# Patient Record
Sex: MALE | Race: WHITE | NOT HISPANIC OR LATINO | Employment: OTHER | URBAN - METROPOLITAN AREA
[De-identification: names, ages, dates, MRNs, and addresses within clinical notes are randomized per-mention and may not be internally consistent; named-entity substitution may affect disease eponyms.]

---

## 2019-11-22 PROBLEM — I48.20 CHRONIC ATRIAL FIBRILLATION (HCC): Status: ACTIVE | Noted: 2019-11-22

## 2019-11-22 PROBLEM — Z95.1 HX OF CABG: Status: ACTIVE | Noted: 2019-11-22

## 2019-11-22 PROBLEM — R63.4 WEIGHT LOSS: Status: ACTIVE | Noted: 2019-11-22

## 2019-11-22 PROBLEM — Z95.0 PACEMAKER: Status: ACTIVE | Noted: 2019-11-22

## 2019-11-22 PROBLEM — J32.9 CHRONIC SINUSITIS: Status: ACTIVE | Noted: 2018-08-20

## 2019-11-22 PROBLEM — M19.90 ARTHRITIS: Status: ACTIVE | Noted: 2019-11-22

## 2019-11-22 PROBLEM — Z87.891 FORMER SMOKER: Status: ACTIVE | Noted: 2019-11-22

## 2019-11-22 PROBLEM — E78.2 MIXED HYPERLIPIDEMIA: Status: ACTIVE | Noted: 2019-11-22

## 2019-11-22 PROBLEM — N40.0 BPH (BENIGN PROSTATIC HYPERPLASIA): Status: ACTIVE | Noted: 2019-11-22

## 2019-11-22 PROBLEM — R73.03 PREDIABETES: Status: ACTIVE | Noted: 2019-11-22

## 2019-11-22 PROBLEM — Z90.79 S/P TURP: Status: ACTIVE | Noted: 2019-11-22

## 2019-11-22 PROBLEM — Z97.4 USES HEARING AID: Status: ACTIVE | Noted: 2019-11-22

## 2019-11-22 PROBLEM — I25.10 CAD (CORONARY ARTERY DISEASE): Status: ACTIVE | Noted: 2019-11-22

## 2020-03-27 PROBLEM — L11.1 GROVER'S DISEASE: Status: ACTIVE | Noted: 2020-03-27

## 2020-05-26 PROBLEM — L11.1 GROVER'S DISEASE: Status: RESOLVED | Noted: 2020-03-27 | Resolved: 2020-05-26

## 2020-11-17 PROBLEM — H91.90 HEARING LOSS: Status: ACTIVE | Noted: 2020-11-17

## 2020-11-17 PROBLEM — K21.9 GASTROESOPHAGEAL REFLUX DISEASE: Status: ACTIVE | Noted: 2020-11-17

## 2020-11-17 PROBLEM — I82.409 DEEP VEIN THROMBOSIS (DVT) (HCC): Status: ACTIVE | Noted: 2020-11-17

## 2020-11-17 PROBLEM — I38 VALVULAR HEART DISEASE: Status: ACTIVE | Noted: 2020-11-17

## 2020-11-17 PROBLEM — I49.5 SICK SINUS SYNDROME (HCC): Status: ACTIVE | Noted: 2020-11-17

## 2020-11-17 PROBLEM — N40.0 ENLARGED PROSTATE: Status: ACTIVE | Noted: 2020-11-17

## 2020-11-25 ENCOUNTER — TELEPHONE (OUTPATIENT)
Dept: CARDIOLOGY | Facility: MEDICAL CENTER | Age: 80
End: 2020-11-25

## 2020-11-25 NOTE — TELEPHONE ENCOUNTER
Referred to [facility] inpatient for AS. No referral in basket and needs apt, recommend structural heart apt to expedite the process. Mod-severe AS on echo. Please call patient, confirm he will come to [facility] for cardiac care and then place referral per notes in chart. SC

## 2020-12-01 ENCOUNTER — TELEPHONE (OUTPATIENT)
Dept: CARDIOLOGY | Facility: MEDICAL CENTER | Age: 80
End: 2020-12-01

## 2020-12-01 NOTE — TELEPHONE ENCOUNTER
"Attempted to contact patient regarding referral to [facility] placed while inpt. Voice mail boxes state leave message for \"Bella\", no such person with that name in patient's medical records. Will continue attempts to contact patient.   "

## 2021-02-18 PROBLEM — I35.0 MILD AORTIC STENOSIS: Status: ACTIVE | Noted: 2021-02-08

## 2021-02-18 PROBLEM — I65.29 CAROTID ATHEROSCLEROSIS: Status: ACTIVE | Noted: 2021-02-08

## 2021-02-18 PROBLEM — I35.0 NONRHEUMATIC AORTIC VALVE STENOSIS: Status: ACTIVE | Noted: 2021-02-08

## 2021-02-18 PROBLEM — M19.042 ARTHRITIS OF BOTH HANDS: Status: ACTIVE | Noted: 2021-02-18

## 2021-02-18 PROBLEM — I35.0 MILD AORTIC STENOSIS: Status: RESOLVED | Noted: 2021-02-08 | Resolved: 2021-02-18

## 2021-02-18 PROBLEM — M19.041 ARTHRITIS OF BOTH HANDS: Status: ACTIVE | Noted: 2021-02-18

## 2021-10-19 PROBLEM — R73.03 PREDIABETES: Status: RESOLVED | Noted: 2019-11-22 | Resolved: 2021-10-19

## 2022-03-02 PROBLEM — I20.89 ANGINA AT REST (HCC): Status: RESOLVED | Noted: 2022-01-19 | Resolved: 2022-03-02

## 2022-03-02 PROBLEM — I35.0 SEVERE AORTIC STENOSIS: Status: ACTIVE | Noted: 2022-01-19

## 2022-03-02 PROBLEM — I20.89 ANGINA AT REST (HCC): Status: ACTIVE | Noted: 2022-01-19

## 2022-03-02 PROBLEM — Z63.4 BEREAVEMENT: Status: ACTIVE | Noted: 2022-03-02

## 2022-09-07 PROBLEM — I10 ESSENTIAL (PRIMARY) HYPERTENSION: Status: ACTIVE | Noted: 2021-10-04

## 2023-10-03 ENCOUNTER — HOSPITAL ENCOUNTER (INPATIENT)
Facility: MEDICAL CENTER | Age: 83
LOS: 2 days | DRG: 281 | End: 2023-10-05
Attending: EMERGENCY MEDICINE | Admitting: STUDENT IN AN ORGANIZED HEALTH CARE EDUCATION/TRAINING PROGRAM
Payer: MEDICARE

## 2023-10-03 ENCOUNTER — APPOINTMENT (OUTPATIENT)
Dept: RADIOLOGY | Facility: MEDICAL CENTER | Age: 83
DRG: 281 | End: 2023-10-03
Attending: EMERGENCY MEDICINE
Payer: MEDICARE

## 2023-10-03 DIAGNOSIS — I21.4 NSTEMI (NON-ST ELEVATED MYOCARDIAL INFARCTION) (HCC): ICD-10-CM

## 2023-10-03 DIAGNOSIS — I82.501 CHRONIC DEEP VEIN THROMBOSIS (DVT) OF RIGHT LOWER EXTREMITY, UNSPECIFIED VEIN (HCC): ICD-10-CM

## 2023-10-03 DIAGNOSIS — I48.20 CHRONIC ATRIAL FIBRILLATION (HCC): ICD-10-CM

## 2023-10-03 DIAGNOSIS — R79.89 ELEVATED TROPONIN: ICD-10-CM

## 2023-10-03 DIAGNOSIS — R42 LIGHTHEADEDNESS: ICD-10-CM

## 2023-10-03 PROBLEM — Z71.89 ACP (ADVANCE CARE PLANNING): Status: ACTIVE | Noted: 2023-10-03

## 2023-10-03 LAB
ALBUMIN SERPL BCP-MCNC: 3.8 G/DL (ref 3.2–4.9)
ALBUMIN/GLOB SERPL: 1.7 G/DL
ALP SERPL-CCNC: 91 U/L (ref 30–99)
ALT SERPL-CCNC: 17 U/L (ref 2–50)
ANION GAP SERPL CALC-SCNC: 11 MMOL/L (ref 7–16)
APTT PPP: 32.3 SEC (ref 24.7–36)
AST SERPL-CCNC: 19 U/L (ref 12–45)
BASOPHILS # BLD AUTO: 0.7 % (ref 0–1.8)
BASOPHILS # BLD: 0.08 K/UL (ref 0–0.12)
BILIRUB SERPL-MCNC: 0.8 MG/DL (ref 0.1–1.5)
BUN SERPL-MCNC: 17 MG/DL (ref 8–22)
CALCIUM ALBUM COR SERPL-MCNC: 8.5 MG/DL (ref 8.5–10.5)
CALCIUM SERPL-MCNC: 8.3 MG/DL (ref 8.5–10.5)
CHLORIDE SERPL-SCNC: 107 MMOL/L (ref 96–112)
CO2 SERPL-SCNC: 24 MMOL/L (ref 20–33)
CREAT SERPL-MCNC: 0.84 MG/DL (ref 0.5–1.4)
EKG IMPRESSION: NORMAL
EKG IMPRESSION: NORMAL
EOSINOPHIL # BLD AUTO: 0.17 K/UL (ref 0–0.51)
EOSINOPHIL NFR BLD: 1.5 % (ref 0–6.9)
ERYTHROCYTE [DISTWIDTH] IN BLOOD BY AUTOMATED COUNT: 45.2 FL (ref 35.9–50)
GFR SERPLBLD CREATININE-BSD FMLA CKD-EPI: 86 ML/MIN/1.73 M 2
GLOBULIN SER CALC-MCNC: 2.3 G/DL (ref 1.9–3.5)
GLUCOSE SERPL-MCNC: 88 MG/DL (ref 65–99)
HCT VFR BLD AUTO: 44.1 % (ref 42–52)
HGB BLD-MCNC: 14.7 G/DL (ref 14–18)
IMM GRANULOCYTES # BLD AUTO: 0.03 K/UL (ref 0–0.11)
IMM GRANULOCYTES NFR BLD AUTO: 0.3 % (ref 0–0.9)
INR PPP: 1.38 (ref 0.87–1.13)
LIPASE SERPL-CCNC: 13 U/L (ref 11–82)
LYMPHOCYTES # BLD AUTO: 2.06 K/UL (ref 1–4.8)
LYMPHOCYTES NFR BLD: 18 % (ref 22–41)
MCH RBC QN AUTO: 30 PG (ref 27–33)
MCHC RBC AUTO-ENTMCNC: 33.3 G/DL (ref 32.3–36.5)
MCV RBC AUTO: 90 FL (ref 81.4–97.8)
MONOCYTES # BLD AUTO: 0.94 K/UL (ref 0–0.85)
MONOCYTES NFR BLD AUTO: 8.2 % (ref 0–13.4)
NEUTROPHILS # BLD AUTO: 8.17 K/UL (ref 1.82–7.42)
NEUTROPHILS NFR BLD: 71.3 % (ref 44–72)
NRBC # BLD AUTO: 0 K/UL
NRBC BLD-RTO: 0 /100 WBC (ref 0–0.2)
PLATELET # BLD AUTO: 184 K/UL (ref 164–446)
PMV BLD AUTO: 9.7 FL (ref 9–12.9)
POTASSIUM SERPL-SCNC: 3.6 MMOL/L (ref 3.6–5.5)
PROT SERPL-MCNC: 6.1 G/DL (ref 6–8.2)
PROTHROMBIN TIME: 17.1 SEC (ref 12–14.6)
RBC # BLD AUTO: 4.9 M/UL (ref 4.7–6.1)
SODIUM SERPL-SCNC: 142 MMOL/L (ref 135–145)
TROPONIN T SERPL-MCNC: 31 NG/L (ref 6–19)
TROPONIN T SERPL-MCNC: 35 NG/L (ref 6–19)
UFH PPP CHRO-ACNC: >1.1 IU/ML
WBC # BLD AUTO: 11.5 K/UL (ref 4.8–10.8)

## 2023-10-03 PROCEDURE — 93005 ELECTROCARDIOGRAM TRACING: CPT | Performed by: EMERGENCY MEDICINE

## 2023-10-03 PROCEDURE — 85730 THROMBOPLASTIN TIME PARTIAL: CPT

## 2023-10-03 PROCEDURE — 700102 HCHG RX REV CODE 250 W/ 637 OVERRIDE(OP): Performed by: STUDENT IN AN ORGANIZED HEALTH CARE EDUCATION/TRAINING PROGRAM

## 2023-10-03 PROCEDURE — 84145 PROCALCITONIN (PCT): CPT

## 2023-10-03 PROCEDURE — 99223 1ST HOSP IP/OBS HIGH 75: CPT | Mod: 25,AI | Performed by: STUDENT IN AN ORGANIZED HEALTH CARE EDUCATION/TRAINING PROGRAM

## 2023-10-03 PROCEDURE — 85520 HEPARIN ASSAY: CPT

## 2023-10-03 PROCEDURE — A9270 NON-COVERED ITEM OR SERVICE: HCPCS | Performed by: STUDENT IN AN ORGANIZED HEALTH CARE EDUCATION/TRAINING PROGRAM

## 2023-10-03 PROCEDURE — 71045 X-RAY EXAM CHEST 1 VIEW: CPT

## 2023-10-03 PROCEDURE — 93005 ELECTROCARDIOGRAM TRACING: CPT

## 2023-10-03 PROCEDURE — 99285 EMERGENCY DEPT VISIT HI MDM: CPT

## 2023-10-03 PROCEDURE — 85025 COMPLETE CBC W/AUTO DIFF WBC: CPT | Mod: 91

## 2023-10-03 PROCEDURE — 84484 ASSAY OF TROPONIN QUANT: CPT | Mod: 91

## 2023-10-03 PROCEDURE — 99497 ADVNCD CARE PLAN 30 MIN: CPT | Performed by: STUDENT IN AN ORGANIZED HEALTH CARE EDUCATION/TRAINING PROGRAM

## 2023-10-03 PROCEDURE — G0316 PR PROLONGED IP/OBS E&M EA 15 MIN: HCPCS | Performed by: STUDENT IN AN ORGANIZED HEALTH CARE EDUCATION/TRAINING PROGRAM

## 2023-10-03 PROCEDURE — 770020 HCHG ROOM/CARE - TELE (206)

## 2023-10-03 PROCEDURE — 83690 ASSAY OF LIPASE: CPT

## 2023-10-03 PROCEDURE — 93010 ELECTROCARDIOGRAM REPORT: CPT | Performed by: STUDENT IN AN ORGANIZED HEALTH CARE EDUCATION/TRAINING PROGRAM

## 2023-10-03 PROCEDURE — 36415 COLL VENOUS BLD VENIPUNCTURE: CPT

## 2023-10-03 PROCEDURE — 85610 PROTHROMBIN TIME: CPT

## 2023-10-03 PROCEDURE — 80053 COMPREHEN METABOLIC PANEL: CPT | Mod: 91

## 2023-10-03 PROCEDURE — 700111 HCHG RX REV CODE 636 W/ 250 OVERRIDE (IP): Performed by: STUDENT IN AN ORGANIZED HEALTH CARE EDUCATION/TRAINING PROGRAM

## 2023-10-03 RX ORDER — HEPARIN SODIUM 1000 [USP'U]/ML
2000 INJECTION, SOLUTION INTRAVENOUS; SUBCUTANEOUS PRN
Status: DISCONTINUED | OUTPATIENT
Start: 2023-10-03 | End: 2023-10-03

## 2023-10-03 RX ORDER — HEPARIN SODIUM 5000 [USP'U]/100ML
INJECTION, SOLUTION INTRAVENOUS CONTINUOUS
Status: DISCONTINUED | OUTPATIENT
Start: 2023-10-03 | End: 2023-10-03

## 2023-10-03 RX ORDER — HEPARIN SODIUM 1000 [USP'U]/ML
2600 INJECTION, SOLUTION INTRAVENOUS; SUBCUTANEOUS PRN
Status: DISCONTINUED | OUTPATIENT
Start: 2023-10-03 | End: 2023-10-03

## 2023-10-03 RX ORDER — ATORVASTATIN CALCIUM 40 MG/1
40 TABLET, FILM COATED ORAL EVERY EVENING
Status: DISCONTINUED | OUTPATIENT
Start: 2023-10-03 | End: 2023-10-05 | Stop reason: HOSPADM

## 2023-10-03 RX ORDER — HEPARIN SODIUM 1000 [USP'U]/ML
4000 INJECTION, SOLUTION INTRAVENOUS; SUBCUTANEOUS ONCE
Status: COMPLETED | OUTPATIENT
Start: 2023-10-03 | End: 2023-10-03

## 2023-10-03 RX ORDER — ACETAMINOPHEN AND CODEINE PHOSPHATE 300; 30 MG/1; MG/1
1 TABLET ORAL
COMMUNITY
End: 2024-02-29 | Stop reason: SDUPTHER

## 2023-10-03 RX ORDER — LISINOPRIL 20 MG/1
20 TABLET ORAL
Status: DISCONTINUED | OUTPATIENT
Start: 2023-10-03 | End: 2023-10-05 | Stop reason: HOSPADM

## 2023-10-03 RX ORDER — HEPARIN SODIUM 5000 [USP'U]/100ML
0-30 INJECTION, SOLUTION INTRAVENOUS CONTINUOUS
Status: DISCONTINUED | OUTPATIENT
Start: 2023-10-03 | End: 2023-10-03

## 2023-10-03 RX ORDER — ACETAMINOPHEN 500 MG
500 TABLET ORAL EVERY 6 HOURS PRN
COMMUNITY

## 2023-10-03 RX ORDER — AZELASTINE HYDROCHLORIDE 137 UG/1
2 SPRAY, METERED NASAL
COMMUNITY

## 2023-10-03 RX ORDER — LABETALOL HYDROCHLORIDE 5 MG/ML
10 INJECTION, SOLUTION INTRAVENOUS EVERY 4 HOURS PRN
Status: DISCONTINUED | OUTPATIENT
Start: 2023-10-03 | End: 2023-10-05 | Stop reason: HOSPADM

## 2023-10-03 RX ORDER — HEPARIN SODIUM 1000 [USP'U]/ML
4000 INJECTION, SOLUTION INTRAVENOUS; SUBCUTANEOUS ONCE
Status: DISCONTINUED | OUTPATIENT
Start: 2023-10-03 | End: 2023-10-03

## 2023-10-03 RX ORDER — ACETAMINOPHEN 325 MG/1
650 TABLET ORAL EVERY 6 HOURS PRN
Status: DISCONTINUED | OUTPATIENT
Start: 2023-10-03 | End: 2023-10-05 | Stop reason: HOSPADM

## 2023-10-03 RX ORDER — ASPIRIN 81 MG/1
81 TABLET, CHEWABLE ORAL DAILY
Status: DISCONTINUED | OUTPATIENT
Start: 2023-10-04 | End: 2023-10-04

## 2023-10-03 RX ORDER — PHENOL 1.4 %
1 AEROSOL, SPRAY (ML) MUCOUS MEMBRANE EVERY EVENING
COMMUNITY

## 2023-10-03 RX ADMIN — HEPARIN SODIUM 1050 UNITS/HR: 5000 INJECTION, SOLUTION INTRAVENOUS at 22:30

## 2023-10-03 RX ADMIN — HEPARIN SODIUM 12 UNITS/KG/HR: 5000 INJECTION, SOLUTION INTRAVENOUS at 19:37

## 2023-10-03 RX ADMIN — ATORVASTATIN CALCIUM 40 MG: 40 TABLET, FILM COATED ORAL at 19:49

## 2023-10-03 RX ADMIN — HEPARIN SODIUM 4000 UNITS: 1000 INJECTION, SOLUTION INTRAVENOUS; SUBCUTANEOUS at 19:36

## 2023-10-03 RX ADMIN — LISINOPRIL 20 MG: 20 TABLET ORAL at 19:49

## 2023-10-03 ASSESSMENT — LIFESTYLE VARIABLES
ON A TYPICAL DAY WHEN YOU DRINK ALCOHOL HOW MANY DRINKS DO YOU HAVE: 1
TOTAL SCORE: 0
EVER HAD A DRINK FIRST THING IN THE MORNING TO STEADY YOUR NERVES TO GET RID OF A HANGOVER: NO
AVERAGE NUMBER OF DAYS PER WEEK YOU HAVE A DRINK CONTAINING ALCOHOL: 4
EVER FELT BAD OR GUILTY ABOUT YOUR DRINKING: NO
HOW MANY TIMES IN THE PAST YEAR HAVE YOU HAD 5 OR MORE DRINKS IN A DAY: 0
TOTAL SCORE: 0
TOTAL SCORE: 0
DOES PATIENT WANT TO STOP DRINKING: NO
HAVE PEOPLE ANNOYED YOU BY CRITICIZING YOUR DRINKING: NO
CONSUMPTION TOTAL: NEGATIVE
ALCOHOL_USE: YES
HAVE YOU EVER FELT YOU SHOULD CUT DOWN ON YOUR DRINKING: NO

## 2023-10-03 ASSESSMENT — COGNITIVE AND FUNCTIONAL STATUS - GENERAL
WALKING IN HOSPITAL ROOM: A LITTLE
CLIMB 3 TO 5 STEPS WITH RAILING: A LITTLE
DAILY ACTIVITIY SCORE: 24
SUGGESTED CMS G CODE MODIFIER MOBILITY: CJ
SUGGESTED CMS G CODE MODIFIER DAILY ACTIVITY: CH
MOBILITY SCORE: 22

## 2023-10-03 ASSESSMENT — ENCOUNTER SYMPTOMS
MUSCULOSKELETAL NEGATIVE: 1
GASTROINTESTINAL NEGATIVE: 1
WEAKNESS: 1
RESPIRATORY NEGATIVE: 1
DIZZINESS: 1
PSYCHIATRIC NEGATIVE: 1
CARDIOVASCULAR NEGATIVE: 1
EYES NEGATIVE: 1

## 2023-10-03 ASSESSMENT — PATIENT HEALTH QUESTIONNAIRE - PHQ9
SUM OF ALL RESPONSES TO PHQ9 QUESTIONS 1 AND 2: 0
2. FEELING DOWN, DEPRESSED, IRRITABLE, OR HOPELESS: NOT AT ALL
1. LITTLE INTEREST OR PLEASURE IN DOING THINGS: NOT AT ALL

## 2023-10-03 ASSESSMENT — FIBROSIS 4 INDEX
FIB4 SCORE: 2
FIB4 SCORE: 2.08
FIB4 SCORE: 2.08

## 2023-10-04 ENCOUNTER — APPOINTMENT (OUTPATIENT)
Dept: RADIOLOGY | Facility: MEDICAL CENTER | Age: 83
DRG: 281 | End: 2023-10-04
Attending: STUDENT IN AN ORGANIZED HEALTH CARE EDUCATION/TRAINING PROGRAM
Payer: MEDICARE

## 2023-10-04 ENCOUNTER — APPOINTMENT (OUTPATIENT)
Dept: CARDIOLOGY | Facility: MEDICAL CENTER | Age: 83
DRG: 281 | End: 2023-10-04
Attending: HOSPITALIST
Payer: MEDICARE

## 2023-10-04 LAB
LV EJECT FRACT  99904: 55
LV EJECT FRACT MOD 2C 99903: 54.25
LV EJECT FRACT MOD 4C 99902: 67.05
LV EJECT FRACT MOD BP 99901: 60.76
PROCALCITONIN SERPL-MCNC: 0.05 NG/ML
TROPONIN T SERPL-MCNC: 32 NG/L (ref 6–19)

## 2023-10-04 PROCEDURE — 93971 EXTREMITY STUDY: CPT | Mod: RT

## 2023-10-04 PROCEDURE — 97162 PT EVAL MOD COMPLEX 30 MIN: CPT

## 2023-10-04 PROCEDURE — 84484 ASSAY OF TROPONIN QUANT: CPT

## 2023-10-04 PROCEDURE — 99233 SBSQ HOSP IP/OBS HIGH 50: CPT | Mod: 25 | Performed by: HOSPITALIST

## 2023-10-04 PROCEDURE — A9270 NON-COVERED ITEM OR SERVICE: HCPCS | Performed by: HOSPITALIST

## 2023-10-04 PROCEDURE — 93306 TTE W/DOPPLER COMPLETE: CPT

## 2023-10-04 PROCEDURE — 700102 HCHG RX REV CODE 250 W/ 637 OVERRIDE(OP): Performed by: STUDENT IN AN ORGANIZED HEALTH CARE EDUCATION/TRAINING PROGRAM

## 2023-10-04 PROCEDURE — 770020 HCHG ROOM/CARE - TELE (206)

## 2023-10-04 PROCEDURE — G0316 PR PROLONGED IP/OBS E&M EA 15 MIN: HCPCS | Performed by: HOSPITALIST

## 2023-10-04 PROCEDURE — 97165 OT EVAL LOW COMPLEX 30 MIN: CPT

## 2023-10-04 PROCEDURE — A9270 NON-COVERED ITEM OR SERVICE: HCPCS | Performed by: STUDENT IN AN ORGANIZED HEALTH CARE EDUCATION/TRAINING PROGRAM

## 2023-10-04 PROCEDURE — 93306 TTE W/DOPPLER COMPLETE: CPT | Mod: 26 | Performed by: INTERNAL MEDICINE

## 2023-10-04 PROCEDURE — 700102 HCHG RX REV CODE 250 W/ 637 OVERRIDE(OP): Performed by: HOSPITALIST

## 2023-10-04 PROCEDURE — 36415 COLL VENOUS BLD VENIPUNCTURE: CPT

## 2023-10-04 RX ADMIN — ATORVASTATIN CALCIUM 40 MG: 40 TABLET, FILM COATED ORAL at 17:46

## 2023-10-04 RX ADMIN — ASPIRIN 81 MG 81 MG: 81 TABLET ORAL at 05:10

## 2023-10-04 RX ADMIN — APIXABAN 10 MG: 5 TABLET, FILM COATED ORAL at 17:46

## 2023-10-04 ASSESSMENT — COGNITIVE AND FUNCTIONAL STATUS - GENERAL
SUGGESTED CMS G CODE MODIFIER DAILY ACTIVITY: CH
MOBILITY SCORE: 18
CLIMB 3 TO 5 STEPS WITH RAILING: A LITTLE
MOVING FROM LYING ON BACK TO SITTING ON SIDE OF FLAT BED: A LITTLE
WALKING IN HOSPITAL ROOM: A LITTLE
SUGGESTED CMS G CODE MODIFIER MOBILITY: CK
TURNING FROM BACK TO SIDE WHILE IN FLAT BAD: A LITTLE
DAILY ACTIVITIY SCORE: 24
STANDING UP FROM CHAIR USING ARMS: A LITTLE
MOVING TO AND FROM BED TO CHAIR: A LITTLE

## 2023-10-04 ASSESSMENT — ENCOUNTER SYMPTOMS
HEADACHES: 0
BRUISES/BLEEDS EASILY: 0
WHEEZING: 0
DIZZINESS: 0
DEPRESSION: 0
VOMITING: 0
BACK PAIN: 0
CLAUDICATION: 0
NAUSEA: 0
HEMOPTYSIS: 0
DOUBLE VISION: 0
HEARTBURN: 0
MYALGIAS: 0
CHILLS: 0
PND: 0
FEVER: 0
PALPITATIONS: 0
COUGH: 0
BLURRED VISION: 0

## 2023-10-04 ASSESSMENT — GAIT ASSESSMENTS
DISTANCE (FEET): 175
GAIT LEVEL OF ASSIST: SUPERVISED

## 2023-10-04 ASSESSMENT — FIBROSIS 4 INDEX
FIB4 SCORE: 2.08
FIB4 SCORE: 2.08

## 2023-10-04 ASSESSMENT — PAIN DESCRIPTION - PAIN TYPE: TYPE: ACUTE PAIN

## 2023-10-04 ASSESSMENT — ACTIVITIES OF DAILY LIVING (ADL): TOILETING: INDEPENDENT

## 2023-10-04 NOTE — THERAPY
Physical Therapy   Initial Evaluation     Patient Name: Triston Vallejo  Age:  83 y.o., Sex:  male  Medical Record #: [id number]  Today's Date: 10/4/2023          Assessment  Patient is 83 y.o. male who dizziness. 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 Pt elevated troponin's, per chart elevated trops likely from uncontrolled hypertension.  Pt mobilizing w/o AD for functional distances. No LOB or SOB. No skilled inpatient or post acute therapy indicated.     Plan         DC Equipment Recommendations: (P) None  Discharge Recommendations: (P) Recommend post-acute placement for additional physical therapy services prior to discharge home         Initial Contact Note    Initial Contact Note Order Received and Verified, Physical Therapy Evaluation in Progress with Full Report to Follow.   Prior Living Situation   Prior Services None   Housing / Facility 1 Story House   Lives with - Patient's Self Care Capacity Alone and Able to Care For Self   Prior Level of Functional Mobility   Bed Mobility Independent   Transfer Status Independent   Ambulation Independent   Assistive Devices Used None   History of Falls   History of Falls Yes   Date of Last Fall 09/28/23  (tripped while working outside)   Cognition    Level of Consciousness Alert   Comments [place]   Active ROM Lower Body    Active ROM Lower Body  WDL   Strength Lower Body   Lower Body Strength  WDL   Sensation Lower Body   Lower Extremity Sensation   Not Tested   Neurological Concerns   Neurological Concerns No   Other Treatments   Other Treatments Provided education on fall prevention, OOB, pacing   Balance Assessment   Sitting Balance (Static) Good   Sitting Balance (Dynamic) Good   Standing Balance (Static) Good   Standing Balance (Dynamic) Fair +   Weight Shift Sitting Good   Weight Shift Standing Fair   Comments w/o AD   Bed Mobility    Supine to Sit Independent   Sit to  Supine Independent   Scooting Independent   Rolling Independent   Gait Analysis   Gait Level Of Assist Supervised   Assistive Device None   Distance (Feet) 175   # of Times Distance was Traveled 1   Deviation   (lateral list  , decreased TO/HS RLE)   Weight Bearing Status no restrictions.   Vision Deficits Impacting Mobility states vision has improved.   Functional Mobility   Sit to Stand Supervised   Bed, Chair, Wheelchair Transfer Supervised   Transfer Method Stand Step   How much difficulty does the patient currently have...   Turning over in bed (including adjusting bedclothes, sheets and blankets)? 3   Sitting down on and standing up from a chair with arms (e.g., wheelchair, bedside commode, etc.) 3   Moving from lying on back to sitting on the side of the bed? 3   How much help from another person does the patient currently need...   Moving to and from a bed to a chair (including a wheelchair)? 3   Need to walk in a hospital room? 3   Climbing 3-5 steps with a railing? 3   6 clicks Mobility Score 18   Activity Tolerance   Sitting in Chair functional   Sitting Edge of Bed functional   Standing functional   Education Group   Education Provided Role of Physical Therapist   Role of Physical Therapist Patient Response Patient;Acceptance;Explanation;Verbal Demonstration   Anticipated Discharge Equipment and Recommendations   DC Equipment Recommendations None   Discharge Recommendations Recommend post-acute placement for additional physical therapy services prior to discharge home   Interdisciplinary Plan of Care Collaboration   IDT Collaboration with  Nursing   Patient Position at End of Therapy Seated   Collaboration Comments RN updated.   Session Information   Date / Session Number  10/4 Ortega only.

## 2023-10-04 NOTE — THERAPY
"Occupational Therapy   Initial Evaluation     Patient Name: Triston Vallejo  Age:  83 y.o., Sex:  male  Medical Record #: [id number]  Today's Date: 10/4/2023          Assessment  Patient is 83 y.o. male who presents to acute w/ dizziness. Pt had elevated troponin's, per chart elevated trops likely from uncontrolled hypertension. Pt appears close to functional baseline performing BADLs at SPV level. No further acute OT needs at this time.     Plan         DC Equipment Recommendations: (P) None  Discharge Recommendations: (P) Anticipate that the patient will have no further occupational therapy needs after discharge from the hospital     Subjective    \"I wish I lived in [place]\"     Objective       10/04/23 1106   Charge Group   OT Evaluation OT Evaluation Low   Total Time Spent   OT Evaluation (Minutes) 20   Initial Contact Note    Initial Contact Note Order Received and Verified, Occupational Therapy Evaluation in Progress with Full Report to Follow.   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 [place]   Prior Level of ADL Function   Self Feeding Independent   Grooming / Hygiene Independent   Bathing Independent   Dressing Independent   Toileting Independent   Prior Level of IADL Function   Medication Management Independent   Laundry Independent   Kitchen Mobility Independent   Finances Independent   Home Management Independent   Shopping Independent   Prior Level Of Mobility Independent Without Device in Community;Independent Without Device in Home   Driving / Transportation Driving Independent   Vitals   O2 (LPM) 0   O2 Delivery Device None - Room Air   Pain 0 - 10 Group   Therapist Pain Assessment Post Activity Pain Same as Prior to Activity;Nurse Notified  (no c/o pain during session)   Cognition  "   Cognition / Consciousness WDL   Level of Consciousness Alert   Comments Ramona, pleasent and cooperative   Active ROM Upper Body   Active ROM Upper Body  WDL   Strength Upper Body   Upper Body Strength  WDL   Coordination Upper Body   Coordination WDL   Balance Assessment   Sitting Balance (Static) Good   Sitting Balance (Dynamic) Good   Standing Balance (Static) Fair +   Standing Balance (Dynamic) Fair +   Weight Shift Sitting Good   Weight Shift Standing Good   Comments no AD, no LOB   Bed Mobility    Supine to Sit Supervised   Sit to Supine   (NT in chair post)   Scooting Supervised   ADL Assessment   Grooming Supervision;Standing   Upper Body Dressing Supervision   Lower Body Dressing Supervision   How much help from another person does the patient currently need...   Putting on and taking off regular lower body clothing? 4   Bathing (including washing, rinsing, and drying)? 4   Toileting, which includes using a toilet, bedpan, or urinal? 4   Putting on and taking off regular upper body clothing? 4   Taking care of personal grooming such as brushing teeth? 4   Eating meals? 4   6 Clicks Daily Activity Score 24   Functional Mobility   Sit to Stand Supervised   Bed, Chair, Wheelchair Transfer Supervised   Toilet Transfers Supervised   Mobility within room and bathroom w/ no AD   Activity Tolerance   Sitting in Chair 10 min (up post)   Sitting Edge of Bed 5 min   Standing 6 min   Education Group   Role of Occupational Therapist Patient Response Patient;Acceptance;Explanation;Demonstration;Verbal Demonstration;Action Demonstration   Anticipated Discharge Equipment and Recommendations   DC Equipment Recommendations None   Discharge Recommendations Anticipate that the patient will have no further occupational therapy needs after discharge from the hospital   Interdisciplinary Plan of Care Collaboration   IDT Collaboration with  Nursing   Patient Position at End of Therapy Seated;Call Light within Reach;Tray Table  within Reach;Phone within Reach   Collaboration Comments report given   Session Information   Date / Session Number  10/4, 1x only

## 2023-10-04 NOTE — CARE PLAN
The patient is Watcher - Medium risk of patient condition declining or worsening    Shift Goals  Clinical Goals: stable cardiac and vital signs  Patient Goals: rest, go home  Family Goals: roland    Progress made toward(s) clinical / shift goals:    Problem: Knowledge Deficit - Standard  Goal: Patient and family/care givers will demonstrate understanding of plan of care, disease process/condition, diagnostic tests and medications  Outcome: Progressing     Problem: Fall Risk  Goal: Patient will remain free from falls  Outcome: Progressing       Patient is not progressing towards the following goals:

## 2023-10-04 NOTE — ED NOTES
Med rec completed per patient at bedside.  Allergies reviewed with patient.  Patient's preferred pharmacy: [facility] in [place].    No outpatient antibiotics within the last 30 days.  ANTICOAGULATION: Patient is on XARELTO. Last castellano 10/2/2023 at 18:00.

## 2023-10-04 NOTE — H&P
Hospital Medicine History & Physical Note    Date of Service  10/3/2023    Primary Care Physician  Pawan Rose M.D.    Consultants  None    Code Status  Full Code    Chief Complaint  Chief Complaint   Patient presents with    Lightheadedness     Pt transferred from [facility] for NSTEMI       History of Presenting Illness  Triston Vallejo is a 83 y.o. male who presented 10/3/2023 with dizziness.    Patient has history of total aortic valve replacement in June 2023, A-fib on Xarelto, CAD, hypertension, pacemaker placement    Patient was seen at [facility] this morning.  He presented for suture removal after a fall involving a head injury on September 18.  CAT scan head at that time was negative for acute pathology.  Patient reported to staff that he has been having intermittent episodes of lightheadedness, shortness of breath, generalized weakness for the last few weeks.  Labs were drawn and patient was diagnosed with NSTEMI.    Pertinent labs include:  White blood cell count 11.7 hemoglobin 14.2 platelet 176  Sodium 138 potassium 4.1 chloride 104 bicarb 26 anion gap 12 BUN 22 creatinine 1  Found to have troponin 67 -> 104  BNP 2284  EKG showing ventricular paced rhythm.    Patient was given 162 mg of aspirin and transferred to [facility] for further management of NSTEMI.      In ER, patient found to have elevated blood pressure.     I discussed the plan of care with patient.    Review of Systems  Review of Systems   Constitutional:  Positive for malaise/fatigue.   HENT: Negative.     Eyes: Negative.    Respiratory: Negative.     Cardiovascular: Negative.    Gastrointestinal: Negative.    Genitourinary: Negative.    Musculoskeletal: Negative.    Skin: Negative.    Neurological:  Positive for dizziness and weakness.   Endo/Heme/Allergies: Negative.    Psychiatric/Behavioral: Negative.         Past Medical History   has a past medical history of A-fib (HCC), Arthritis, CAD (coronary artery disease),  Diverticulitis, HTN (hypertension), Myocardial infarct (HCC) (3/22/2016), and Pacemaker.    Surgical History   has a past surgical history that includes pacemaker insertion; multiple coronary artery bypass (05/27/2016); hip arthroplasty total (Bilateral); and aortic valve replacement.     Family History  family history is not on file.   Family history reviewed with patient. There is no family history that is pertinent to the chief complaint.     Social History   reports that he has quit smoking. His smoking use included cigarettes. He has never used smokeless tobacco. He reports current alcohol use. He reports that he does not use drugs.    Allergies  Allergies   Allergen Reactions    Norco [Hydrocodone-Acetaminophen]     Repatha [Evolocumab]      swelling    Statins [Hmg-Coa-R Inhibitors]     Valium        Medications  Prior to Admission Medications   Prescriptions Last Dose Informant Patient Reported? Taking?   XARELTO 20 MG Tab tablet   No No   Sig: TAKE 1 TABLET WITH DINNER (NEED LABS, NEED TO ESTABLISH WITH NEW PRIMARY CARE PHYSICIAN)   furosemide (LASIX) 20 MG Tab   Yes No   Sig: Take 20 mg by mouth every day.   losartan (COZAAR) 25 MG Tab   Yes No   Sig: Take 25 mg by mouth every day.   nitroglycerin (NITROSTAT) 0.4 MG SL Tab   Yes No   Sig: Place 0.4 mg under the tongue every 5 minutes as needed for Chest Pain.      Facility-Administered Medications: None       Physical Exam  Temp:  [36.1 °C (97 °F)-36.7 °C (98 °F)] 36.7 °C (98 °F)  Pulse:  [60-68] 60  Resp:  [13-22] 16  BP: (152-193)/(82-95) 193/85  SpO2:  [94 %-96 %] 95 %  Blood Pressure : (!) 193/85   Temperature: 36.7 °C (98 °F)   Pulse: 60   Respiration: 16   Pulse Oximetry: 95 %       Physical Exam  Constitutional:       Appearance: Normal appearance. He is normal weight.   HENT:      Head:      Comments: Small scar noted on right side of patient's scalp     Nose: Nose normal.      Mouth/Throat:      Mouth: Mucous membranes are moist.   Eyes:       "Pupils: Pupils are equal, round, and reactive to light.   Cardiovascular:      Rate and Rhythm: Normal rate and regular rhythm.      Comments: Left-sided pacemaker noted  Pulmonary:      Effort: Pulmonary effort is normal.   Abdominal:      General: Abdomen is flat. Bowel sounds are normal.      Palpations: Abdomen is soft.   Musculoskeletal:         General: Normal range of motion.      Cervical back: Neck supple.      Comments: Right lower extremity, has a yellow discoloration.  Neurovascularly intact.  Slightly greater in diameter than left lower extremity   Skin:     General: Skin is warm.   Neurological:      General: No focal deficit present.      Mental Status: He is alert and oriented to person, place, and time. Mental status is at baseline.   Psychiatric:         Mood and Affect: Mood normal.         Behavior: Behavior normal.         Thought Content: Thought content normal.         Judgment: Judgment normal.         Laboratory:  Recent Labs     10/03/23  1030   WBC 11.7*   RBC 4.72   HEMOGLOBIN 14.2   HEMATOCRIT 42.9   MCV 90.9   MCH 30.1   MCHC 33.1   RDW 13.6   PLATELETCT 176   MPV 9.6     Recent Labs     10/03/23  1030   SODIUM 138   POTASSIUM 4.1   CHLORIDE 104   CO2 26   GLUCOSE 114*   BUN 22*   CREATININE 1.0   CALCIUM 8.7     Recent Labs     10/03/23  1030   ALTSGPT 18   ASTSGOT 18   ALKPHOSPHAT 96   TBILIRUBIN 0.7   GLUCOSE 114*         Recent Labs     10/03/23  1030   NTPROBNP 2284*         No results for input(s): \"TROPONINT\" in the last 72 hours.    Imaging:  DX-CHEST-PORTABLE (1 VIEW)    (Results Pending)       EKG:  I have personally reviewed the images and compared with prior images.    Assessment/Plan:  Justification for Admission Status  I anticipate this patient will require at least two midnights for appropriate medical management, necessitating inpatient admission because patient has NSTEMI    Patient will need a Telemetry bed on EMERGENCY service .  The need is secondary to NSTEMI.    * " NSTEMI (non-ST elevated myocardial infarction) ([facility])- (present on admission)  Assessment & Plan  Spoke with ERP.  Patient presents for on and off dizziness for the last few weeks and generalized weakness.  Found to have troponin 67 and 104 from outside facility.  Last took Xarelto 24 hours ago.  EKG showing ventricular paced rhythm.  Patient given 162 mg aspirin at outside facility.  Patient can be started on heparin drip.  Monitor for bleeding or heparin drip.  Continue to trend troponin.  Cardiology consult in AM.    Patient noted to have swelling and discoloration in right lower extremity.  Neurovascularly intact.  Ultrasound DVT, patient on heparin drip.    ACP (advance care planning)  Assessment & Plan  Spoke with ERP.  Patient 60 minutes spent discussing goals of care with patient.  He was explained his clinical diagnosis and treatment plan.  He states that he would like to be full code and to have everything done        VTE prophylaxis: therapeutic anticoagulation with heparin drip    Total time spent on visit 90 minutes reviewing records from outside facility, discussing treatment, prognosis, plan of care, risk benefits.

## 2023-10-04 NOTE — ASSESSMENT & PLAN NOTE
Spoke with ERP.  Patient presents for on and off dizziness for the last few weeks and generalized weakness.  CT head negative.  Found to have troponin 67 and 104 from outside facility.  Last took Xarelto 24 hours ago.  EKG showing ventricular paced rhythm.  Patient given 162 mg aspirin at outside facility.  Patient can be started on heparin drip.  Monitor for bleeding or heparin drip.  Continue to trend troponin.  Cardiology consult in AM.    Patient noted to have swelling and discoloration in right lower extremity.  Neurovascularly intact.  Ultrasound DVT, patient on heparin drip.    Discussed with patient's cardiology patient had recent cardiac angiogram that looked stable, I have discontinued stress test, will follow-up echocardiogram, will need follow-up with cardiology as outpatient.  Patient denies any chest pain palpitations.  Troponin trending down

## 2023-10-04 NOTE — PROGRESS NOTES
4 Eyes Skin Assessment Completed by FLAKITO Asher and FLAKITO Brito.    Head Bruising and Scar  Ears WDL  Nose WDL  Mouth WDL  Neck WDL  Breast/Chest WDL  Shoulder Blades WDL  Spine WDL  (R) Arm/Elbow/Hand Bruising and Scab  (L) Arm/Elbow/Hand Bruising and Scab  Abdomen WDL  Groin WDL  Scrotum/Coccyx/Buttocks WDL  (R) Leg Scab, Bruising, and Swelling  (L) Leg WDL  (R) Heel/Foot/Toe WDL  (L) Heel/Foot/Toe WDL          Devices In Places Tele Box      Interventions In Place Pillows    Possible Skin Injury No    Pictures Uploaded Into Epic Yes  Wound Consult Placed N/A  RN Wound Prevention Protocol Ordered No

## 2023-10-04 NOTE — ED PROVIDER NOTES
ER Provider Note    Scribed for Jeff Rascon D.O. by Mayela James. 10/3/2023  5:09 PM    Primary Care Provider: Pawan Rose M.D.    CHIEF COMPLAINT  Chief Complaint   Patient presents with    Lightheadedness     Pt transferred from [facility] for NSTEMI       HPI/ROS  LIMITATION TO HISTORY   None    OUTSIDE HISTORIAN(S):  EMS, who was able to help contribute to the patient's history     Triston Vallejo is a 83 y.o. male who presents to the Emergency Department via EMS as a Transfer from [facility]. Patient reports that as he was getting out of his car, he slipped and fell, causing injury to the right side of his face. At that time, he was seen at [facility] and had stiches placed for his laceration to the right side of his face. Today, he went to have his stiches removed. However, he expressed complaints of right leg pain that has been hurting since his fall, along with lightheadedness and general tiredness. He had diagnostic testing done, which showed an elevated troponin. He was diagnosed with an NSTEMI, and was then directed the patient here to the ED for further evaluation. Patient has associated imbalance. Denies any chest pain, shortness of breath, nausea, vomiting, fevers, or chills. No alleviating or exacerbating factors reported. History of atrial fibrillation, and has a pacemaker. He is currently on Xarelto, noting his last dose was last night. He also had a valve replacement 6 weeks ago. Drug allergies to Norco, Repatha, statins, and Valium.     ROS as per HPI.    PAST MEDICAL HISTORY  Past Medical History:   Diagnosis Date    A-fib (HCC)     Arthritis     hands and neck    CAD (coronary artery disease)     Diverticulitis     HTN (hypertension)     Myocardial infarct (HCC) 3/22/2016    Pacemaker        SURGICAL HISTORY  Past Surgical History:   Procedure Laterality Date    MULTIPLE CORONARY ARTERY BYPASS  05/27/2016    AORTIC VALVE REPLACEMENT      HIP ARTHROPLASTY  TOTAL Bilateral     PACEMAKER INSERTION         FAMILY HISTORY  No family history noted.     SOCIAL HISTORY   reports that he has quit smoking. His smoking use included cigarettes. He has never used smokeless tobacco. He reports current alcohol use. He reports that he does not use drugs.    CURRENT MEDICATIONS  Current Discharge Medication List        CONTINUE these medications which have NOT CHANGED    Details   rivaroxaban (XARELTO) 20 MG Tab tablet Take 20 mg by mouth with dinner.      Azelastine (ASTELIN) 137 MCG/SPRAY Solution Administer 2 Sprays into each nostril 1 time a day as needed (Allergies).      Multiple Vitamins-Minerals (MULTIVITAMIN ADULTS 50+) Tab Take 1 Tablet by mouth every evening.      acetaminophen-codeine #3 (TYLENOL #3) 300-30 MG Tab Take 1 Tablet by mouth at bedtime as needed for Moderate Pain or Severe Pain.      acetaminophen (TYLENOL) 500 MG Tab Take 500 mg by mouth every 6 hours as needed for Mild Pain.      nitroglycerin (NITROSTAT) 0.4 MG SL Tab Place 0.4 mg under the tongue every 5 minutes as needed for Chest Pain.             ALLERGIES  Norco [hydrocodone-acetaminophen], Repatha [evolocumab], Statins [hmg-coa-r inhibitors], and Valium    PHYSICAL EXAM  BP (!) 176/86   Pulse 68   Temp 36.7 °C (98 °F) (Temporal)   Resp 16   Wt 70.3 kg (155 lb)   SpO2 94%   BMI 25.02 kg/m²     General: No acute distress.  HENT: Normocephalic, Mucus membranes are moist.   Chest: Lungs have even and unlabored respirations, Clear to auscultation.   Cardiovascular: Regular rate and regular rhythm, No peripheral cyanosis.  Abdomen: Non distended.  Neuro: Awake, Conversive, Able to relay recent events.  Psychiatric: Calm and cooperative.     EXTERNAL RECORDS REVIEWED  Review of patient's past medical records shows that the patient's initial troponin was 67 and repeat was 104, and [place] 562[address]. He was given Lasix and Lisinopril there. He is on anticoagulation therapy.      INITIAL ASSESSMENT  Patient  was transferred from other facility for up trending troponins. His chief complaint was tiredness and feeling not well. He does not have any chest pain or shortness of breath. He is currently asymptomatic except for feeling hungry. He does have a significant cardiac history including aortic valve replacement, bypass, and a pacemaker. He is currently on Xarelto for anticoagulation, so aspirin is not indicated. We will plan for admission.      ED Observation Status? No; Patient does not meet criteria for ED Observation.     DIAGNOSTIC STUDIES    Labs:   Results for orders placed or performed during the hospital encounter of 10/03/23   CBC WITH DIFFERENTIAL   Result Value Ref Range    WBC 11.5 (H) 4.8 - 10.8 K/uL    RBC 4.90 4.70 - 6.10 M/uL    Hemoglobin 14.7 14.0 - 18.0 g/dL    Hematocrit 44.1 42.0 - 52.0 %    MCV 90.0 81.4 - 97.8 fL    MCH 30.0 27.0 - 33.0 pg    MCHC 33.3 32.3 - 36.5 g/dL    RDW 45.2 35.9 - 50.0 fL    Platelet Count 184 164 - 446 K/uL    MPV 9.7 9.0 - 12.9 fL    Neutrophils-Polys 71.30 44.00 - 72.00 %    Lymphocytes 18.00 (L) 22.00 - 41.00 %    Monocytes 8.20 0.00 - 13.40 %    Eosinophils 1.50 0.00 - 6.90 %    Basophils 0.70 0.00 - 1.80 %    Immature Granulocytes 0.30 0.00 - 0.90 %    Nucleated RBC 0.00 0.00 - 0.20 /100 WBC    Neutrophils (Absolute) 8.17 (H) 1.82 - 7.42 K/uL    Lymphs (Absolute) 2.06 1.00 - 4.80 K/uL    Monos (Absolute) 0.94 (H) 0.00 - 0.85 K/uL    Eos (Absolute) 0.17 0.00 - 0.51 K/uL    Baso (Absolute) 0.08 0.00 - 0.12 K/uL    Immature Granulocytes (abs) 0.03 0.00 - 0.11 K/uL    NRBC (Absolute) 0.00 K/uL   COMP METABOLIC PANEL   Result Value Ref Range    Sodium 142 135 - 145 mmol/L    Potassium 3.6 3.6 - 5.5 mmol/L    Chloride 107 96 - 112 mmol/L    Co2 24 20 - 33 mmol/L    Anion Gap 11.0 7.0 - 16.0    Glucose 88 65 - 99 mg/dL    Bun 17 8 - 22 mg/dL    Creatinine 0.84 0.50 - 1.40 mg/dL    Calcium 8.3 (L) 8.5 - 10.5 mg/dL    Correct Calcium 8.5 8.5 - 10.5 mg/dL    AST(SGOT) 19 12 -  45 U/L    ALT(SGPT) 17 2 - 50 U/L    Alkaline Phosphatase 91 30 - 99 U/L    Total Bilirubin 0.8 0.1 - 1.5 mg/dL    Albumin 3.8 3.2 - 4.9 g/dL    Total Protein 6.1 6.0 - 8.2 g/dL    Globulin 2.3 1.9 - 3.5 g/dL    A-G Ratio 1.7 g/dL   LIPASE   Result Value Ref Range    Lipase 13 11 - 82 U/L   TROPONIN   Result Value Ref Range    Troponin T 31 (H) 6 - 19 ng/L   ESTIMATED GFR   Result Value Ref Range    GFR (CKD-EPI) 86 >60 mL/min/1.73 m 2   aPTT   Result Value Ref Range    APTT 32.3 24.7 - 36.0 sec   Prothrombin Time   Result Value Ref Range    PT 17.1 (H) 12.0 - 14.6 sec    INR 1.38 (H) 0.87 - 1.13   Heparin Xa (Unfractionated)   Result Value Ref Range    Heparin Xa (UFH) >1.10 (HH) IU/mL   EKG   Result Value Ref Range    Report       [facility] Emergency Dept.    Test Date:  2023-10-03  Pt Name:    VERONICA PEREZ                  Department: ER  MRN:        [id number]                      Room:        15  Gender:     Male                         Technician: [id number]  :        1940                   Requested By:FLAKITO ROSSI  Order #:    [id number]                    Reading MD: FLAKITO ROSSI D.O.    Measurements  Intervals                                Axis  Rate:       60                           P:          0  AL:         156                          QRS:        -82  QRSD:       155                          T:          90  QT:         464  QTc:        464    Interpretive Statements  Ventricular-paced rhythm  No further analysis attempted due to paced rhythm  No previous ECG available for comparison  Electronically Signed On 10- 17:26:40 PDT by FLAKITO ROSSI D.O.     EKG (NOW)   Result Value Ref Range    Report       [facility]    Test Date:  2023-10-03  Pt Name:    VERONICA PEREZ                  Department: ER  MRN:        [id number]                      Room:       37  Gender:     Male                         Technician: VIR  :        1940                    Requested By:FLAKITO HA FLO  Order #:    [id number]                    Reading MD:    Measurements  Intervals                                Axis  Rate:       60                           P:          0  AK:         0                            QRS:        -87  QRSD:       155                          T:          88  QT:         486  QTc:        486    Interpretive Statements  Afib/flutter and ventricular-paced rhythm  No further analysis attempted due to paced rhythm  Compared to ECG 10/03/2023 17:13:01  No significant changes          EKG:   I have independently interpreted the above EKG.    Radiology:   The attending emergency physician has independently interpreted the diagnostic imaging associated with this visit and am waiting the final reading from the radiologist.   Preliminary interpretation is as follows: No pneumonia  Radiologist interpretation:   DX-CHEST-PORTABLE (1 VIEW)   Final Result      Left basilar atelectasis and/or pneumonitis.      US-EXTREMITY VENOUS LOWER UNILAT RIGHT    (Results Pending)        COURSE & MEDICAL DECISION MAKING     COURSE AND PLAN  5:09 PM - Patient seen and examined at bedside. Patient presents to the ED as a Transfer from [facility] [place] [place] for an NSTEMI. Discussed plan of care, including obtaining lab work and imaging for further evaluation. I also informed the patient of the plan for admission. Patient agrees to the plan of care. Ordered for DX-chest, EKG, CBC with diff, CMP, Lipase and Troponin to evaluate his symptoms.     5:27 PM - Hospitalist responded. I discussed the patient's case and the above findings with Dr. Mcdonough (Hospitalist) who agrees to evaluate the patient for hospitalization.      ED Summary: Patient presents with elevated troponin, he was transferred from a outlying facility.  I spoke with the transferring physician to get further history.    Patient presented primarily with some lightheadedness complaints after he went to the ER initially  for suture removal.  The evaluation showed that he had trending elevation of his troponin he has not had chest pain or shortness of breath but he does have significant cardiac history.  With the elevated troponin and cardiac history the patient will be admitted for further evaluation and treatment    Decision tools and prescription drugs considered including, but not limited to: Heart score 7    ADDITIONAL PROBLEM LIST      DISPOSITION AND DISCUSSIONS  I have discussed management of the patient with the following physicians and ISABELA's: Dr. Mcdonough (Hospitalist)     Discussion of management with other [facility] or appropriate source(s): None    Barriers to care at this time, including but not limited to: None     Patient will be hospitalized by Dr. Mcdonough (Hospitalist) in guarded condition.     FINAL DIAGNOSIS  1. Lightheadedness    2. Elevated troponin        I, Mayela James (Scribe), am scribing for, and in the presence of, Jeff Rascon D.O..    Electronically signed by: Mayela James (Scribe), 10/3/2023    I, Jeff Rascon D.O. personally performed the services described in this documentation, as scribed by Mayela James in my presence, and it is both accurate and complete.     The note accurately reflects work and decisions made by me.  Jeff Rascon D.O.  10/3/2023  7:26 PM

## 2023-10-04 NOTE — PROGRESS NOTES
Patient noted to have minimally elevated troponins at [facility].  Heparin drip discontinued, possibly rising troponin from uncontrolled hypertension.  Stress test ordered.

## 2023-10-04 NOTE — ED TRIAGE NOTES
Chief Complaint   Patient presents with    Lightheadedness     [place] [facility] [place]     Pt currently w/o complaint.  States he went to CV to have sutures removed.  Stated he didn't feel well and they discovered elevated troponin

## 2023-10-05 ENCOUNTER — HOSPITAL ENCOUNTER (OUTPATIENT)
Dept: RADIOLOGY | Facility: MEDICAL CENTER | Age: 83
End: 2023-10-05
Payer: MEDICARE

## 2023-10-05 ENCOUNTER — PATIENT OUTREACH (OUTPATIENT)
Dept: SCHEDULING | Facility: IMAGING CENTER | Age: 83
End: 2023-10-05
Payer: MEDICARE

## 2023-10-05 ENCOUNTER — PHARMACY VISIT (OUTPATIENT)
Dept: PHARMACY | Facility: MEDICAL CENTER | Age: 83
End: 2023-10-05
Payer: COMMERCIAL

## 2023-10-05 VITALS
RESPIRATION RATE: 16 BRPM | OXYGEN SATURATION: 94 % | BODY MASS INDEX: 21.14 KG/M2 | HEART RATE: 61 BPM | SYSTOLIC BLOOD PRESSURE: 111 MMHG | DIASTOLIC BLOOD PRESSURE: 64 MMHG | HEIGHT: 71 IN | WEIGHT: 151.01 LBS | TEMPERATURE: 97.5 F

## 2023-10-05 PROBLEM — I21.4 NSTEMI (NON-ST ELEVATED MYOCARDIAL INFARCTION) (HCC): Status: RESOLVED | Noted: 2023-10-03 | Resolved: 2023-10-05

## 2023-10-05 PROBLEM — I35.0 SEVERE AORTIC STENOSIS: Status: RESOLVED | Noted: 2022-01-19 | Resolved: 2023-10-05

## 2023-10-05 PROBLEM — Z71.89 ACP (ADVANCE CARE PLANNING): Status: RESOLVED | Noted: 2023-10-03 | Resolved: 2023-10-05

## 2023-10-05 LAB
ERYTHROCYTE [DISTWIDTH] IN BLOOD BY AUTOMATED COUNT: 44.9 FL (ref 35.9–50)
HCT VFR BLD AUTO: 45.5 % (ref 42–52)
HGB BLD-MCNC: 15.3 G/DL (ref 14–18)
MCH RBC QN AUTO: 30.2 PG (ref 27–33)
MCHC RBC AUTO-ENTMCNC: 33.6 G/DL (ref 32.3–36.5)
MCV RBC AUTO: 89.7 FL (ref 81.4–97.8)
PLATELET # BLD AUTO: 225 K/UL (ref 164–446)
PMV BLD AUTO: 9.5 FL (ref 9–12.9)
RBC # BLD AUTO: 5.07 M/UL (ref 4.7–6.1)
WBC # BLD AUTO: 12.4 K/UL (ref 4.8–10.8)

## 2023-10-05 PROCEDURE — A9270 NON-COVERED ITEM OR SERVICE: HCPCS | Performed by: HOSPITALIST

## 2023-10-05 PROCEDURE — 36415 COLL VENOUS BLD VENIPUNCTURE: CPT

## 2023-10-05 PROCEDURE — 700102 HCHG RX REV CODE 250 W/ 637 OVERRIDE(OP): Performed by: STUDENT IN AN ORGANIZED HEALTH CARE EDUCATION/TRAINING PROGRAM

## 2023-10-05 PROCEDURE — RXMED WILLOW AMBULATORY MEDICATION CHARGE: Performed by: HOSPITALIST

## 2023-10-05 PROCEDURE — 99239 HOSP IP/OBS DSCHRG MGMT >30: CPT | Performed by: HOSPITALIST

## 2023-10-05 PROCEDURE — 85027 COMPLETE CBC AUTOMATED: CPT

## 2023-10-05 PROCEDURE — A9270 NON-COVERED ITEM OR SERVICE: HCPCS | Performed by: STUDENT IN AN ORGANIZED HEALTH CARE EDUCATION/TRAINING PROGRAM

## 2023-10-05 PROCEDURE — 700102 HCHG RX REV CODE 250 W/ 637 OVERRIDE(OP): Performed by: HOSPITALIST

## 2023-10-05 RX ORDER — ATORVASTATIN CALCIUM 10 MG/1
10 TABLET, FILM COATED ORAL EVERY EVENING
Qty: 30 TABLET | Refills: 0 | Status: SHIPPED | OUTPATIENT
Start: 2023-10-05 | End: 2023-11-04

## 2023-10-05 RX ORDER — LISINOPRIL 20 MG/1
20 TABLET ORAL DAILY
Qty: 30 TABLET | Refills: 0 | Status: SHIPPED | OUTPATIENT
Start: 2023-10-06

## 2023-10-05 RX ADMIN — APIXABAN 10 MG: 5 TABLET, FILM COATED ORAL at 04:27

## 2023-10-05 RX ADMIN — LISINOPRIL 20 MG: 20 TABLET ORAL at 04:27

## 2023-10-05 RX ADMIN — APIXABAN 10 MG: 5 TABLET, FILM COATED ORAL at 16:09

## 2023-10-05 RX ADMIN — ATORVASTATIN CALCIUM 40 MG: 40 TABLET, FILM COATED ORAL at 16:09

## 2023-10-05 ASSESSMENT — FIBROSIS 4 INDEX: FIB4 SCORE: 1.7

## 2023-10-05 NOTE — ASSESSMENT & PLAN NOTE
Discussed with patient's cardiologist  in [facility], patient had cardiac cath this year previous aortic valve replacement that showed stable coronary artery disease and graft with a occlusion but stable not requiring any further intervention, I discussed stress test and since patient had recent cardiac angiogram stress test is not needed since patient also did not complain of any chest pain and patient had a recent TAVR procedure 6 weeks ago.  Echo pending today

## 2023-10-05 NOTE — PROGRESS NOTES
Hospital Medicine Daily Progress Note    Date of Service  10/4/2023    Chief Complaint  Triston Vallejo is a 83 y.o. male admitted 10/3/2023 with NSTEMI    Hospital Course  Triston Vallejo is a 83 y.o. male who presented 10/3/2023 with dizziness.     Patient has history of total aortic valve replacement in June 2023, A-fib on Xarelto, CAD, hypertension, pacemaker placement     Patient was seen at [facility] this morning.  He presented for suture removal after a fall involving a head injury on September 18.  CAT scan head at that time was negative for acute pathology.  Patient reported to staff that he has been having intermittent episodes of lightheadedness, shortness of breath, generalized weakness for the last few weeks.  Labs were drawn and patient was diagnosed with NSTEMI.     Pertinent labs include:  White blood cell count 11.7 hemoglobin 14.2 platelet 176  Sodium 138 potassium 4.1 chloride 104 bicarb 26 anion gap 12 BUN 22 creatinine 1  Found to have troponin 67 -> 104  BNP 2284  EKG showing ventricular paced rhythm.     Patient was given 162 mg of aspirin and transferred to [facility] for further management of NSTEMI.    Interval Problem Update  10/4 patient is new to me today, I saw patient earlier this morning, patient is alert oriented follows commands, he complaining of right lower extremity swelling but is improved today, he denies any dizziness lightheadedness, he denies any blurry vision, he feels much better today.  Later today I reviewed patient's ultrasound that was positive for subacute DVT, I went to talk with patient and discussed regarding result discussed regarding anticoagulation patient is already on Xarelto, I discussed with patient regarding possible transition to Eliquis he asked me to discuss it with his cardiology, I called patient's cardiologist Dr. Ortiz in [facility] who is okay with switching to Eliquis, we also discussed regarding findings on troponin, patient had  recent angiogram this year previous to his aortic valve replacement which was stable, we concur that stress test was not needed at this time since patient did not complain of any chest pain troponin is trending down, we will follow-up on echocardiogram.  Came back to patient's room to discuss my discussion with his cardiology patient expressed understanding, will switch to Eliquis, discussed with nurse staff, will start diet, will monitor oral overnight and hopefully DC home tomorrow, all question have been answered.    I have discussed this patient's plan of care and discharge plan at IDT rounds today with Case Management, Nursing, Nursing leadership, and other members of the IDT team.    Consultants/Specialty  None    Code Status  Full Code    Disposition  The patient is not medically cleared for discharge to home or a post-acute facility.      I have placed the appropriate orders for post-discharge needs.    Review of Systems  Review of Systems   Constitutional:  Negative for chills and fever.   Eyes:  Negative for blurred vision and double vision.   Respiratory:  Negative for cough, hemoptysis and wheezing.    Cardiovascular:  Negative for chest pain, palpitations, claudication, leg swelling and PND.   Gastrointestinal:  Negative for heartburn, nausea and vomiting.   Genitourinary:  Negative for hematuria and urgency.   Musculoskeletal:  Negative for back pain and myalgias.   Skin:  Negative for rash.   Neurological:  Negative for dizziness and headaches.   Endo/Heme/Allergies:  Does not bruise/bleed easily.   Psychiatric/Behavioral:  Negative for depression.         Physical Exam  Temp:  [36.4 °C (97.5 °F)-37.1 °C (98.8 °F)] 36.5 °C (97.7 °F)  Pulse:  [60-62] 61  Resp:  [16-18] 18  BP: (107-170)/(59-84) 115/60  SpO2:  [92 %-97 %] 92 %    Physical Exam  Vitals and nursing note reviewed.   Constitutional:       General: He is not in acute distress.     Appearance: Normal appearance.   HENT:      Nose: Nose  normal.   Eyes:      General: No scleral icterus.        Right eye: No discharge.         Left eye: No discharge.      Conjunctiva/sclera: Conjunctivae normal.   Cardiovascular:      Rate and Rhythm: Normal rate and regular rhythm.      Pulses: Normal pulses.      Heart sounds: Normal heart sounds.   Pulmonary:      Effort: Pulmonary effort is normal. No respiratory distress.      Breath sounds: Normal breath sounds.   Abdominal:      General: Bowel sounds are normal. There is no distension.      Tenderness: There is no abdominal tenderness. There is no guarding.   Musculoskeletal:         General: Normal range of motion.      Cervical back: Normal range of motion and neck supple.      Right lower leg: No edema.      Left lower leg: Edema present.   Skin:     General: Skin is warm.      Capillary Refill: Capillary refill takes less than 2 seconds.      Coloration: Skin is not jaundiced.   Neurological:      General: No focal deficit present.      Mental Status: He is alert and oriented to person, place, and time.      Cranial Nerves: No cranial nerve deficit.      Motor: No weakness.   Psychiatric:         Mood and Affect: Mood normal.         Behavior: Behavior normal.         Fluids    Intake/Output Summary (Last 24 hours) at 10/4/2023 1746  Last data filed at 10/4/2023 1728  Gross per 24 hour   Intake 745.87 ml   Output 0 ml   Net 745.87 ml       Laboratory  Recent Labs     10/03/23  1030 10/03/23  1735   WBC 11.7* 11.5*   RBC 4.72 4.90   HEMOGLOBIN 14.2 14.7   HEMATOCRIT 42.9 44.1   MCV 90.9 90.0   MCH 30.1 30.0   MCHC 33.1 33.3   RDW 13.6 45.2   PLATELETCT 176 184   MPV 9.6 9.7     Recent Labs     10/03/23  1030 10/03/23  1735   SODIUM 138 142   POTASSIUM 4.1 3.6   CHLORIDE 104 107   CO2 26 24   GLUCOSE 114* 88   BUN 22* 17   CREATININE 1.0 0.84   CALCIUM 8.7 8.3*     Recent Labs     10/03/23  1735   APTT 32.3   INR 1.38*               Imaging  EC-ECHOCARDIOGRAM COMPLETE W/O CONT   Final Result       US-EXTREMITY VENOUS LOWER UNILAT RIGHT   Final Result      DX-CHEST-PORTABLE (1 VIEW)   Final Result      Left basilar atelectasis and/or pneumonitis.           Assessment/Plan  * NSTEMI (non-ST elevated myocardial infarction) (HCC)- (present on admission)  Assessment & Plan  Spoke with ERP.  Patient presents for on and off dizziness for the last few weeks and generalized weakness.  CT head negative.  Found to have troponin 67 and 104 from outside facility.  Last took Xarelto 24 hours ago.  EKG showing ventricular paced rhythm.  Patient given 162 mg aspirin at outside facility.  Patient can be started on heparin drip.  Monitor for bleeding or heparin drip.  Continue to trend troponin.  Cardiology consult in AM.    Patient noted to have swelling and discoloration in right lower extremity.  Neurovascularly intact.  Ultrasound DVT, patient on heparin drip.    Discussed with patient's cardiology patient had recent cardiac angiogram that looked stable, I have discontinued stress test, will follow-up echocardiogram, will need follow-up with cardiology as outpatient.  Patient denies any chest pain palpitations.  Troponin trending down    ACP (advance care planning)  Assessment & Plan  Spoke with ERP.  Patient 60 minutes spent discussing goals of care with patient.  He was explained his clinical diagnosis and treatment plan.  He states that he would like to be full code and to have everything done    Severe aortic stenosis- (present on admission)  Assessment & Plan  Status post aortic valve replacement procedure in [facility].    Sick sinus syndrome (HCC)- (present on admission)  Assessment & Plan  S/p pacemaker placement    Deep vein thrombosis (DVT) (HCC)- (present on admission)  Assessment & Plan  Started on Eliquis  Discussed with pharmacist    Chronic atrial fibrillation (HCC)- (present on admission)  Assessment & Plan  Patient was on Xarelto but developed right lower extremity DVTs he is going to be switched  to Eliquis twice daily this also was discussed with Dr. Ortiz patient's cardiology.    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  Echo pending today         VTE prophylaxis: Eliquis    I have performed a physical exam and reviewed and updated ROS and Plan today (10/4/2023). In review of yesterday's note (10/3/2023), there are no changes except as documented above.      Greater than 66 minutes spent prepping to see patient (e.g. reviewing  tests/imaging results, notes from consultants, bedside nurse, night shift ) obtaining and/or reviewing separately obtained history. Performing a medically appropriate examination and evaluation.  Counseling and educating the patient.  Ordering medications, tests, or procedures.  Referring and communicating with other health care professionals.  Documenting clinical information in EPIC.  Independently interpreting results and communicating results to patient.  Care coordination.

## 2023-10-05 NOTE — DISCHARGE PLANNING
Meds-to-Beds: Discharge prescription orders listed below delivered to patient's bedside. FLAKITO Major notified. Patient counseled. Patient elected to have co-payment billed to patient account.     Current Outpatient Medications   Medication Sig Dispense Refill    [START ON 10/11/2023] apixaban (ELIQUIS) 5mg Tab Take 2 Tablets by mouth 2 times a day for 6 days, then take 1 Tablet by mouth 2 times a day thereafter 84 Tablet 0    atorvastatin (LIPITOR) 10 MG Tab Take 1 Tablet by mouth every evening for 30 days. 30 Tablet 0    [START ON 10/6/2023] lisinopril (PRINIVIL) 20 MG Tab Take 1 Tablet by mouth every day. 30 Tablet 0      Aniyah Monaco, PharmD

## 2023-10-05 NOTE — ASSESSMENT & PLAN NOTE
Patient was on Xarelto but developed right lower extremity DVTs he is going to be switched to Eliquis twice daily this also was discussed with Dr. Ortiz patient's cardiology.

## 2023-10-05 NOTE — ASSESSMENT & PLAN NOTE
Status post aortic valve replacement procedure in [facility].

## 2023-10-05 NOTE — DISCHARGE SUMMARY
Discharge Summary    CHIEF COMPLAINT ON ADMISSION  Chief Complaint   Patient presents with    Lightheadedness     [place] [facility] [place]       Reason for Admission  ems     Admission Date  10/3/2023    CODE STATUS  Full Code    HPI & HOSPITAL COURSE    Please see original H&P for specific information  Triston Vallejo is a 83 y.o. male who presented 10/3/2023 with dizziness.     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  He presented for suture removal after a fall involving a head injury on September 18.  CAT scan head at that time was negative for acute pathology.  Patient reported to staff that he has been having intermittent episodes of lightheadedness, shortness of breath, generalized weakness for the last few weeks.  Labs were drawn and patient was diagnosed with NSTEMI.     Pertinent labs include:  White blood cell count 11.7 hemoglobin 14.2 platelet 176  Sodium 138 potassium 4.1 chloride 104 bicarb 26 anion gap 12 BUN 22 creatinine 1  Found to have troponin 67 -> 104  BNP 2284  EKG showing ventricular paced rhythm.     Patient was given 162 mg of aspirin and transferred to [facility] for further management of NSTEMI.    Patient's troponin is trending down, patient had echocardiogram that did show normal ejection fraction and is stable, I have discussed with patient's cardiology in [facility]  who stated that patient had recent cardiac angiogram that was stable and did not require any intervention, at this time patient will continue with medical management and follow-up with his cardiology next week, patient was found to have a subacute DVT in his right lower extremity patient was transitioned from Xarelto to Eliquis, I discussed this with patient's cardiology who agreed with plan of care, patient today is alert oriented follows commands he is  feeling much better, denies any chest pain or shortness of breath no palpitation no focal weakness no numbness no tingling he is tolerating diet, patient had negative procalcitonin, mildly elevated leukocytosis but no other signs of infection no fever no chills, again patient is feeling much better he is eager to go home, PT OT evaluated patient and PT recommended skilled nursing but OT recommended home health care, home health care has been ordered and I discussed with , patient has expressed understanding of his plan of care and agree with it he will follow-up with primary care physician and with cardiology as outpatient, all question have been answered.      Therefore, he is discharged in good and stable condition to home with organized home healthcare and close outpatient follow-up.    The patient met 2-midnight criteria for an inpatient stay at the time of discharge.    Discharge Date  10/5/2023    FOLLOW UP ITEMS POST DISCHARGE  Primary care physician  Cardiology    DISCHARGE DIAGNOSES  Principal Problem (Resolved):    NSTEMI (non-ST elevated myocardial infarction) (HCC) (POA: Yes)  Active Problems:    CAD (coronary artery disease) (POA: Yes)    Hx of CABG (POA: Yes)    Pacemaker (POA: Yes)    BPH (benign prostatic hyperplasia) (POA: Yes)    S/P TURP (POA: Yes)    Chronic atrial fibrillation (HCC) (POA: Yes)      Overview: In a-fib about 30% of the time    Deep vein thrombosis (DVT) (HCC) (POA: Yes)    Sick sinus syndrome (HCC) (POA: Yes)  Resolved Problems:    Severe aortic stenosis (POA: Yes)    ACP (advance care planning) (POA: Unknown)      FOLLOW UP  Future Appointments   Date Time Provider Department Center   2/22/2024 10:30 AM Pawan Rose M.D. [facility]     No follow-up provider specified.    MEDICATIONS ON DISCHARGE     Medication List        START taking these medications        Instructions   * apixaban 5mg Tabs  Commonly known as: Eliquis   Take 2 Tablets by mouth 2 times a day  "for 6 days. Indications: DVT/PE  Dose: 10 mg     * apixaban 5mg Tabs  Start taking on: October 11, 2023  Commonly known as: Eliquis   Take 1 Tablet by mouth 2 times a day for 30 days. Start on 10/11/23 evening.  Indications: DVT/PE  Dose: 5 mg     atorvastatin 10 MG Tabs  Commonly known as: Lipitor   Take 1 Tablet by mouth every evening for 30 days.  Dose: 10 mg     lisinopril 20 MG Tabs  Start taking on: October 6, 2023  Commonly known as: Prinivil   Take 1 Tablet by mouth every day.  Dose: 20 mg           * This list has 2 medication(s) that are the same as other medications prescribed for you. Read the directions carefully, and ask your doctor or other care provider to review them with you.                CONTINUE taking these medications        Instructions   acetaminophen 500 MG Tabs  Commonly known as: Tylenol   Take 500 mg by mouth every 6 hours as needed for Mild Pain.  Dose: 500 mg     acetaminophen-codeine #3 300-30 MG Tabs  Commonly known as: Tylenol #3   Take 1 Tablet by mouth at bedtime as needed for Moderate Pain or Severe Pain.  Dose: 1 Tablet     Azelastine 137 MCG/SPRAY Soln  Commonly known as: Astelin   Administer 2 Sprays into each nostril 1 time a day as needed (Allergies).  Dose: 2 Spray     Multivitamin Adults 50+ Tabs   Take 1 Tablet by mouth every evening.  Dose: 1 Tablet     nitroglycerin 0.4 MG Subl  Commonly known as: Nitrostat   Place 0.4 mg under the tongue every 5 minutes as needed for Chest Pain.  Dose: 0.4 mg            STOP taking these medications      Xarelto 20 MG Tabs tablet  Generic drug: rivaroxaban              Allergies  Allergies   Allergen Reactions    Hydrocodone Unspecified     Patient states, \"It makes me crazy\"    Repatha [Evolocumab] Swelling     10/3/2023 - patient unable to verify allergy/reaction  Historical reaction documented: \"Swelling\"    Valium Unspecified     Patient states, \"It makes me crazy\"    Statins [Hmg-Coa-R Inhibitors] Unspecified     10/3/2023 - " patient unable to verify allergy/reaction  No reaction documented       DIET  Orders Placed This Encounter   Procedures    Diet Order Diet: Cardiac     Standing Status:   Standing     Number of Occurrences:   1     Order Specific Question:   Diet:     Answer:   Cardiac [6]       ACTIVITY  As tolerated.  Weight bearing as tolerated    CONSULTATIONS  None    PROCEDURES  None    LABORATORY  Lab Results   Component Value Date    SODIUM 142 10/03/2023    POTASSIUM 3.6 10/03/2023    CHLORIDE 107 10/03/2023    CO2 24 10/03/2023    GLUCOSE 88 10/03/2023    BUN 17 10/03/2023    CREATININE 0.84 10/03/2023        Lab Results   Component Value Date    WBC 12.4 (H) 10/05/2023    HEMOGLOBIN 15.3 10/05/2023    HEMATOCRIT 45.5 10/05/2023    PLATELETCT 225 10/05/2023        Total time of the discharge process exceeds 33 minutes.

## 2023-10-05 NOTE — CARE PLAN
Problem: Knowledge Deficit - Standard  Goal: Patient and family/care givers will demonstrate understanding of plan of care, disease process/condition, diagnostic tests and medications  Outcome: Progressing  Note: Pt's whiteboard is updated, pt has been updated on POC, all questions have been answered at this time     The patient is Watcher - Medium risk of patient condition declining or worsening    Shift Goals  Clinical Goals: monitor heart rate and rhythm  Patient Goals: discharge  Family Goals: roland    Progress made toward(s) clinical / shift goals:  safety, ready for discharge    Patient is not progressing towards the following goals:

## 2023-10-05 NOTE — CARE PLAN
The patient is Stable - Low risk of patient condition declining or worsening    Shift Goals  Clinical Goals: monitor vital signs  Patient Goals: rest  Family Goals: roland    Progress made toward(s) clinical / shift goals:    Problem: Knowledge Deficit - Standard  Goal: Patient and family/care givers will demonstrate understanding of plan of care, disease process/condition, diagnostic tests and medications  Outcome: Progressing     Problem: Fall Risk  Goal: Patient will remain free from falls  Outcome: Progressing       Patient is not progressing towards the following goals:

## 2023-10-05 NOTE — PROGRESS NOTES
Bedside report received from night shift RN. Assumed care at 0700. Pt is A&Ox4. Pt is in bed. Pt denies pain at this time. Pt was updated on plan of care. Pt has call light, personal belongings, and bedside table within reach. Bed is in the lowest position.

## 2023-10-05 NOTE — DISCHARGE PLANNING
"CHW Juan Carlos introduced community care management. Pt declines any barriers to housing, transportation, medication, DME, or food.   Pt states that he has an \"employee\" coming from [employer] that is going to pick him up from the hospital at 4pm. HCM updated.     Community Health Worker Intake    Identified no barriers.  No resources provided.  Contact information provided to Triston A Vallejo   Inpatient assessment completed.    Plan: CHW will no longer follow as the pt has declined services.       "

## 2023-10-06 NOTE — PROGRESS NOTES
Discharge instructions provided to pt and relative. Discussed follow up appointments, diet, medications and activity. Pt states understanding. IV was removed. Copy of discharge provided to the patient. A signed copy of discharge is in patient's chart. All personal belongings are in patients possession. All questions have been answered. Patient is being wheeled off floor. Pt has [id number] in possession

## 2024-03-28 ENCOUNTER — HOSPITAL ENCOUNTER (OUTPATIENT)
Dept: RADIOLOGY | Facility: MEDICAL CENTER | Age: 84
End: 2024-03-28

## 2024-05-20 ENCOUNTER — HOSPITAL ENCOUNTER (OUTPATIENT)
Dept: RADIOLOGY | Facility: MEDICAL CENTER | Age: 84
End: 2024-05-20
Attending: PSYCHIATRY & NEUROLOGY
Payer: MEDICARE

## 2024-05-20 DIAGNOSIS — G45.9 INTERMITTENT CEREBRAL ISCHEMIA: ICD-10-CM

## 2024-05-20 DIAGNOSIS — G45.3 AMAUROSIS FUGAX: ICD-10-CM

## 2024-05-20 DIAGNOSIS — D32.9 BENIGN NEOPLASM OF MENINGES (HCC): ICD-10-CM

## 2024-05-20 NOTE — PROGRESS NOTES
Patient here for MRI of Brain. Noted that patient had pacemaker as his gown had slipped. He also noted on his screening sheet that he had a pacemaker. None noted in Implant tab or Media tab. Patient unaware of device company and no card with him. Per chart review, device check from 1/20/22 revealed brand to be [employer]. Contacted [employer] for more information on device. Patient instructed to contact scheduling and reschedule with rep. Email sent to scheduling by Che FRAGA. Patient discharged with instructions to reschedule.

## 2025-01-17 ENCOUNTER — HOSPITAL ENCOUNTER (OUTPATIENT)
Dept: RADIOLOGY | Facility: MEDICAL CENTER | Age: 85
End: 2025-01-17
Attending: NURSE PRACTITIONER
Payer: MEDICARE

## 2025-03-25 NOTE — PROGRESS NOTES
Telephone Appointment Visit   This telephone visit was initiated by the provider Meir Dickson MD and the patient verbally consented.    Reason for Call:     incidental neurovascular imaging finding    HPI:    Triston Vallejo is a 84 y.o. male seen in clinic for evaluation evaluation for incidental imaging findings of a fusiform dilatation of the ACOM artery.      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  has a history of TIA with left eye visual changes, atrial fibrillation on DOAC, pacemaker implant for sick sinus syndrome, heart valve disease, CAD with CABG, heart valve disease, HTN, polyneuropathy, abnormal EEG, memory loss, and dizziness. The patient has been referred for evaluation and treatment of the ACOM artery finding.    He is seen today for evaluation and discussion of imaging findings. He lives in [place], [place] and [place], [place] and is accompanied by a support person to today's consultation.    Hyperlipidemia: yes  Hypertension: yes  Smoking: former  Diabetes Mellitus: no    Past Medical History:   Diagnosis Date    A-fib (HCC)     Arthritis     hands and neck    CAD (coronary artery disease)     Diverticulitis     HTN (hypertension)     Myocardial infarct (HCC) 3/22/2016    Pacemaker      Past Surgical History:   Procedure Laterality Date    MULTIPLE CORONARY ARTERY BYPASS  05/27/2016    AORTIC VALVE REPLACEMENT      HIP ARTHROPLASTY TOTAL Bilateral     PACEMAKER INSERTION       Social History     Socioeconomic History    Marital status:      Spouse name: Not on file    Number of children: Not on file    Years of education: Not on file    Highest education level: Not on file   Occupational History    Not on file   Tobacco Use    Smoking status:  Former     Types: Cigarettes    Smokeless tobacco: Never   Vaping Use    Vaping status: Never Used   Substance and Sexual Activity    Alcohol use: Yes     Comment: occ    Drug use: Never    Sexual activity: Not on file   Other Topics Concern    Not on file   Social History Narrative    Not on file     Social Drivers of Health     Financial Resource Strain: Not on file   Food Insecurity: Not on file   Transportation Needs: Not on file   Physical Activity: Not on file   Stress: Not on file   Social Connections: Not on file   Intimate Partner Violence: Not on file   Housing Stability: Not on file     No family history on file.    Labs:    Latest Reference Range & Units Most Recent   WBC 4.8 - 10.8 K/uL 9.8  3/7/24 10:53   RBC 4.70 - 6.10 M/uL 5.02  3/7/24 10:53   Hemoglobin 14.0 - 18.0 g/dL 14.9  3/7/24 10:53   Hematocrit 42.0 - 52.0 % 46.2  3/7/24 10:53   MCV 80.0 - 94.0 fL 92.0  3/7/24 10:53   MCH 27.0 - 31.0 pg 29.7  3/7/24 10:53   MCHC 33.0 - 37.0 g/dL 32.3 (L)  3/7/24 10:53   RDW 11.5 - 14.5 % 13.3  3/7/24 10:53   Platelet Count 130 - 400 K/uL 192  3/7/24 10:53   MPV 7.4 - 10.4 fL 10.0  3/7/24 10:53   Neutrophils-Polys % 82 (E)  12/24/23 11:45   Neutrophils Automated 39.0 - 70.0 % 73.4 (H)  10/3/23 10:30   Abs Neutrophils Automated 1.8 - 7.7 K/uL 8.6 (H)  10/3/23 10:30   Neutrophils (Absolute) 2.0 - 8.0 K/uL 11.5 (H) (E)  12/24/23 11:45   Lymphocytes % 10 (E)  12/24/23 11:45   Lymphocytes Automated 21.0 - 50.0 % 15.7 (L)  10/3/23 10:30   Abs Lymph Automated 1.2 - 4.8 K/uL 1.8  10/3/23 10:30   Lymphs (Absolute) 1.00 - 4.80 K/uL 2.06  10/3/23 17:35   Monocytes % 6 (E)  12/24/23 11:45   Monos (Absolute) 0.0 - 0.8 K/uL 0.9 (H) (E)  12/24/23 11:45   Eosinophils % 1 (E)  12/24/23 11:45   Eosinophils Automated 0.0 - 5.0 % 1.2  10/3/23 10:30   Eos (Absolute) 0.0 - 0.5 K/uL 0.1 (E)  12/24/23 11:45   Basophils % 1 (E)  12/24/23 11:45   Basophils Automated 0.0 - 3.0 % 0.7  10/3/23 10:30   Baso (Absolute) 0.0 - 0.2 K/uL 0.1  (E)  12/24/23 11:45   Immature Granulocytes 0.00 - 0.90 % 0.30  10/3/23 17:35   Immature Granulocytes (abs) 0.00 - 0.11 K/uL 0.03  10/3/23 17:35   Nucleated RBC 0.00 - 0.20 /100 WBC 0.00  10/3/23 17:35   NRBC (Absolute) K/uL 0.00  10/3/23 17:35   Monocytes Automated 2.0 - 9.0 % 8.7  10/3/23 10:30   Eosinophil Count, Blood 0.00 - 0.50 K/uL 0.14  10/3/23 10:30   Lymphocyte Absolute # 1.0 - 5.1 K/uL 1.4 (E)  12/24/23 11:45   Sodium 136 - 145 mmol/L 139  3/7/24 10:53   Potassium 3.5 - 5.1 mmol/L 4.2  3/7/24 10:53   Chloride 98 - 107 mmol/L 105  3/7/24 10:53   Co2 21 - 32 mmol/L 27  3/7/24 10:53   Anion Gap 10 - 18 mmol/L 11  3/7/24 10:53   Glucose 74 - 99 mg/dL 110 (H)  3/7/24 10:53   Bun 7 - 18 mg/dL 31 (H)  3/7/24 10:53   Creatinine 0.8 - 1.3 mg/dL 1.3  3/7/24 10:53   GFR (CKD-EPI) >60 mL/min/1.73 m 2 54 !  3/7/24 10:53   Calcium 8.5 - 11.0 mg/dL 8.9  3/7/24 10:53   Correct Calcium 8.5 - 10.5 mg/dL 8.5  10/3/23 17:35   AST(SGOT) 15 - 37 U/L 18  3/7/24 10:53   ALT(SGPT) 12 - 78 U/L 22  3/7/24 10:53   Alkaline Phosphatase 46 - 116 U/L 109  3/7/24 10:53   Total Bilirubin 0.2 - 1.0 mg/dL 0.7  3/7/24 10:53   Albumin 3.4 - 5.0 g/dL 3.8  3/7/24 10:53   Total Protein 6.4 - 8.2 g/dL 6.9  3/7/24 10:53   Globulin 1.9 - 3.5 g/dL 2.3  10/3/23 17:35   A-G Ratio  1.2  3/7/24 10:53   Uric Acid 3.5 - 8.5 mg/dL 6.8  3/7/24 10:53   Lipase 11 - 82 U/L 13  10/3/23 17:35   Glycohemoglobin <=5.8 % 5.7  8/12/24 14:15   Estim. Avg Glu mg/dL 128  4/4/24 11:37   Cholesterol,Tot 120 - 200 mg/dL 172  3/7/24 10:53   Triglycerides 0 - 150 mg/dL 52  3/7/24 10:53   HDL 40.0 - 60.0 mg/dL 60.0  3/7/24 10:53   Non HDL Cholesterol 30 - 160  112  3/7/24 10:53   LDL <100 mg/dL 102 (H)  3/7/24 10:53   Chol-Hdl Ratio  2.87  3/7/24 10:53   Troponin I 0.0 - 60.3 pg/mL 104.9 (HH)  10/3/23 13:20   Troponin T 6 - 19 ng/L 32 (H)  10/4/23 15:03   NT-proBNP 0 - 450 pg/mL 2284 (H)  10/3/23 10:30   Urobilinogen, Urine 0.2 - 1.0 mg/dL 0.2  10/3/23 14:45   Glucose -  Accu-Ck 70 - 100 mg/dL 143 (H) (E)  12/24/23 15:36   INR 0.87 - 1.13  1.38 (H)  10/3/23 17:35   Misc Referent Test Frozen  REPORT  4/4/24 11:37   PT 12.0 - 14.6 sec 17.1 (H)  10/3/23 17:35   APTT 24.7 - 36.0 sec 32.3  10/3/23 17:35   Heparin Xa (UFH) IU/mL >1.10 (HH)  10/3/23 17:35   (HH): Data is critically high  (L): Data is abnormally low  (H): Data is abnormally high  !: Data is abnormal  (E): External lab result    Radiology:   CT head w/o 2/25/25 at [facility]:  1.  No acute intracranial abnormality. Specifically negative for acute infarct   hemorrhage or significant mass effect.   2.  Mild age-related generalized atrophy and chronic white matter changes.   3.  Inflammatory mucosal thickening within the maxillary sinuses     CTA Head 11/18/24 at [facility]:  1.  No proximal large vessel occlusion or high-grade stenosis.   2.  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   3.  Calcifications of the intracranial carotid arteries with mild stenoses.   4.  Interstitial calcifications of the carotid bifurcations bilaterally with   mild less than 30 percent stenoses of bilateral proximal cervical ICAs.   5.  Left dominant vertebral artery. Calcifications of the V4 segments of both   vertebral arteries with right vertebral artery PICA termination.   6.  Multifocal moderate stenoses of the bilateral posterior cerebral arteries.     Stenoses measured are derived by comparing the narrowest segment with the distal   luminal diameter.     CTA Neck 11/18/24 at [facility]:  1.  No proximal large vessel occlusion or high-grade stenosis.   2.  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   3.  Calcifications of the intracranial carotid arteries with mild stenoses.   4.  Interstitial calcifications of the carotid bifurcations bilaterally with   mild less than 30 percent stenoses of bilateral proximal cervical ICAs.   5.  Left dominant vertebral artery. Calcifications of the V4  "segments of both   vertebral arteries with right vertebral artery PICA termination.   6.  Multifocal moderate stenoses of the bilateral posterior cerebral arteries.     Stenoses measured are derived by comparing the narrowest segment with the distal   luminal diameter.          Current Outpatient Medications   Medication Sig Dispense Refill    gabapentin (NEURONTIN) 300 MG Cap Take 300 mg by mouth every day. Reports taking every other day      XARELTO 20 MG Tab tablet       lisinopril (PRINIVIL) 20 MG Tab Take 1 Tablet by mouth every day. 30 Tablet 0    nitroglycerin (NITROSTAT) 0.4 MG SL Tab Place 0.4 mg under the tongue every 5 minutes as needed for Chest Pain.      Azelastine (ASTELIN) 137 MCG/SPRAY Solution Administer 2 Sprays into each nostril 1 time a day as needed (Allergies).      Multiple Vitamins-Minerals (MULTIVITAMIN ADULTS 50+) Tab Take 1 Tablet by mouth every evening.      acetaminophen (TYLENOL) 500 MG Tab Take 500 mg by mouth every 6 hours as needed for Mild Pain.       No current facility-administered medications for this visit.       Allergies   Allergen Reactions    Hydrocodone Unspecified     Patient states, \"It makes me crazy\"    Repatha [Evolocumab] Swelling     10/3/2023 - patient unable to verify allergy/reaction  Historical reaction documented: \"Swelling\"    Valium Unspecified     Patient states, \"It makes me crazy\"    Statins [Hmg-Coa-R Inhibitors] Unspecified     10/3/2023 - patient unable to verify allergy/reaction  No reaction documented       Physical Exam  Neurological:      Mental Status: He is alert and oriented to person, place, and time.   Psychiatric:         Mood and Affect: Mood normal.         Cognition and Memory: Memory normal.         Judgment: Judgment normal.       Impression:   1. 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  2. Non flow limiting intracranial atherosclerotic disease.  3. Hyperlipidemia.  4. Hypertension.  5. Coronary artery " disease status post CABG.  6. Pacemaker placed for sick sinus syndrome.  7. History of tobacco use.    Plan:   Meir Dickson MD reviewed the imaging and discussed the findings with the patient. The finding of ACOM artery fusiform dilatation is incidental and asymptomatic in regards to the patient's multiple neurologic symptoms. Anatomic variants are common and do not require intervention, medical treatment, or follow up. Risk factors for vascular health that include hypertension, hyperglycemia, hyperlipidemia, and tobacco use, and modifiable risk factors were discussed. The patient requested this note be forwarded to his cardiologist in [place] and will provide the contact information directly to the [facility] physician.     Follow-up: No restrictions for activity, medication, or diet from [facility] standpoint. No subsequent imaging or intervention required for this finding.    Meir Dickson MD  Neuro Interventional Service   [address] ([id number])  [place] [address]  [phone]

## 2025-03-27 ENCOUNTER — HOSPITAL ENCOUNTER (OUTPATIENT)
Dept: RADIOLOGY | Facility: MEDICAL CENTER | Age: 85
End: 2025-03-27
Attending: NURSE PRACTITIONER
Payer: MEDICARE

## 2025-03-27 DIAGNOSIS — I67.1 BRAIN ANEURYSM: ICD-10-CM
